# Patient Record
Sex: FEMALE | Race: ASIAN | NOT HISPANIC OR LATINO | Employment: UNEMPLOYED | URBAN - METROPOLITAN AREA
[De-identification: names, ages, dates, MRNs, and addresses within clinical notes are randomized per-mention and may not be internally consistent; named-entity substitution may affect disease eponyms.]

---

## 2019-10-15 ENCOUNTER — RESULTS ONLY (OUTPATIENT)
Dept: FAMILY MEDICINE | Facility: CLINIC | Age: 39
End: 2019-10-15

## 2019-10-15 ENCOUNTER — OFFICE VISIT (OUTPATIENT)
Dept: FAMILY MEDICINE | Facility: CLINIC | Age: 39
End: 2019-10-15
Payer: COMMERCIAL

## 2019-10-15 DIAGNOSIS — J18.9 PNEUMONIA OF RIGHT MIDDLE LOBE DUE TO INFECTIOUS ORGANISM: ICD-10-CM

## 2019-10-15 DIAGNOSIS — R05.9 COUGH: Primary | ICD-10-CM

## 2019-10-15 LAB
ALBUMIN SERPL BCP-MCNC: 3.8 G/DL (ref 3.5–5)
ALP SERPL-CCNC: 75 U/L (ref 45–120)
ALT SERPL W/O P-5'-P-CCNC: 42 U/L (ref 0–45)
ANION GAP SERPL CALCULATED.3IONS-SCNC: 10 MMOL/L (ref 5–18)
AST SERPL-CCNC: 24 U/L (ref 0–40)
BASOPHILS # BLD AUTO: 0.1 THOU/UL (ref 0–0.2)
BASOPHILS NFR BLD AUTO: 1 % (ref 0–2)
BILIRUB SERPL-MCNC: 0.3 MG/DL (ref 0–1)
BUN SERPL-MCNC: 11 MG/DL (ref 8–22)
CALCIUM SERPL-MCNC: 9.5 MG/DL (ref 8.5–10.5)
CHLORIDE SERPL-SCNC: 105 MMOL/L (ref 98–107)
CO2 SERPL-SCNC: 24 MMOL/L (ref 22–31)
CREAT SERPL-MCNC: 0.71 MG/DL (ref 0.6–1.1)
EOSINOPHIL # BLD AUTO: 0.1 THOU/UL (ref 0–0.4)
EOSINOPHIL NFR BLD AUTO: 1 % (ref 0–6)
ERYTHROCYTE [DISTWIDTH] IN BLOOD BY AUTOMATED COUNT: 12.6 % (ref 11–14.5)
GLUCOSE SERPL-MCNC: 90 MG/DL (ref 70–125)
HCT VFR BLD AUTO: 45.8 % (ref 35–47)
HGB BLD-MCNC: 14.7 G/DL (ref 12–16)
LYMPHOCYTES # BLD AUTO: 1.7 THOU/UL (ref 0.8–4.4)
LYMPHOCYTES NFR BLD AUTO: 19 % (ref 20–40)
MCH RBC QN AUTO: 28.8 PG (ref 27–34)
MCHC RBC AUTO-ENTMCNC: 32.1 G/DL (ref 32–36)
MCV RBC AUTO: 90 FL (ref 80–100)
MONOCYTES # BLD AUTO: 0.5 THOU/UL (ref 0–0.9)
MONOCYTES NFR BLD AUTO: 6 % (ref 2–10)
NEUTROPHILS # BLD AUTO: 6.5 THOU/UL (ref 2–7.7)
NEUTROPHILS NFR BLD AUTO: 74 % (ref 50–70)
PLATELET # BLD AUTO: 394 THOU/UL (ref 140–440)
PMV BLD AUTO: 10.7 FL (ref 8.5–12.5)
POTASSIUM SERPL-SCNC: 4.1 MMOL/L (ref 3.5–5)
PROT SERPL-MCNC: 7.8 G/DL (ref 6–8)
RBC # BLD AUTO: 5.1 MILL/UL (ref 3.8–5.4)
SODIUM SERPL-SCNC: 139 MMOL/L (ref 136–145)
WBC # BLD AUTO: 8.8 THOU/UL (ref 4–11)

## 2019-10-15 NOTE — LETTER
October 16, 2019      Adrianna Bro  [address] [facility] [address]        Dear Adrianna,    Thank you for getting your care at [facility]. Please see below for your test results.    Your xray showed a pneumonia on the right. You will need to get another chest xray in 2 months to make sure that the changes go away.      If you have any concerns about these results please call and leave a message for me or send a [facility] message to the clinic.    Sincerely,    Gabby Quiroga MD

## 2019-10-16 ENCOUNTER — TELEPHONE (OUTPATIENT)
Dept: FAMILY MEDICINE | Facility: CLINIC | Age: 39
End: 2019-10-16

## 2019-10-16 VITALS
SYSTOLIC BLOOD PRESSURE: 113 MMHG | HEART RATE: 71 BPM | OXYGEN SATURATION: 97 % | RESPIRATION RATE: 24 BRPM | TEMPERATURE: 98.5 F | DIASTOLIC BLOOD PRESSURE: 77 MMHG | WEIGHT: 165 LBS | BODY MASS INDEX: 33.33 KG/M2

## 2019-10-16 LAB — 25(OH)D3 SERPL-MCNC: 19.2 NG/ML (ref 30–80)

## 2019-10-16 RX ORDER — LEVOFLOXACIN 750 MG/1
750 TABLET, FILM COATED ORAL DAILY
Qty: 5 TABLET | Refills: 0 | COMMUNITY
Start: 2019-10-16 | End: 2021-07-13

## 2019-10-16 NOTE — TELEPHONE ENCOUNTER
[facility] phone call message- general phone call:    Reason for call: She came in yesterday and seen  the medication she was prescribed is cause a lot of side affects. She wants to know if she should continue to take it or not. She would also like a call back with her results.    Action desired: call back.    Return call needed: Yes    OK to leave a message on voice mail? Yes    Advised patient to response may take up to 2 business days: Yes    Primary language: English      needed? No    Call taken on October 16, 2019 at 9:15 AM by Stephon Charlton

## 2019-10-16 NOTE — PROGRESS NOTES
DOMINICK Rodas is a 39 year old  female  who presents for the new concern(s) of:    Chief Complaint   Patient presents with     Sick     been sick for 1 month, body aches, cough, green mucus, deep coughing, and fatigue        About a month ago, started with body ache and fever (did not check at home) a dn chills with a subsequent cough that was deep, and produced green sputum. The cough is actually getting better but continues very fatigue, with decreased appetite and decreased weight. At times she noted with a deep cough some shortness of breath but otherwise not. She is sleeping OK, and does not think she would be better if she were to sleep more. Did have some left upper back symptoms.     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    Also wanting to check her Vitamin D level.          Patient Active Problem List   Diagnosis     History of infertility, female     Oligomenorrhea     Hirsutism       No current outpatient medications on file.          Allergies   Allergen Reactions     Nkda [No Known Drug Allergies]         Results from the last 24 hoursNo results found for this or any previous visit (from the past 24 hour(s)).         Review of Systems:     CONSTITUTIONAL: as above  SKIN: no worrisome rashes, no worrisome moles, no worrisome lesions  NEURO: + frontal headaches, not facial pain  EYES: no acute vision problems or changes  ENT: no ear problems, no mouth problems, no throat problems  RESP: + cough, no shortness of breath  CV: no chest pain, no palpitations, no new or worsening peripheral edema  GI: no nausea, no vomiting, no constipation, no diarrhea, no belly pain  EXT: no edema            Physical Exam:     Vitals:    10/16/19 1025   BP: 113/77   Pulse: 71   Resp: 24   Temp: 98.5  F (36.9  C)   TempSrc: Oral   SpO2: 97%   Weight: 74.8 kg (165 lb)     Body mass index is 33.33 kg/m .  Vitals were reviewed and were normal  GENERAL: healthy, alert, well nourished, well hydrated, no distress  HENT:  ear canals- normal; TMs- normal; Nose- normal; Mouth- no ulcers, no lesions  NECK: no tenderness, no adenopathy, no asymmetry, no masses, no stiffness; thyroid- normal to palpation  RESP: lungs clear to auscultation - no rales, no rhonchi, no wheezes  CV: regular rates and rhythm, normal S1 S2, no S3 or S4 and no murmur, no click or rub -  ABDOMEN: soft, no tenderness, no  hepatosplenomegaly, no masses, normal bowel sounds  MS: extremities- no gross deformities noted, no edema    CXR: RML consolidation.        Results for orders placed or performed in visit on 03/25/15   HPV [place] PCR ([employer])   Result Value Ref Range    Interpretation No HPV Type(s) Detected No HPV Typ     Elliott Keyes MD, [employer]     Narrative    Test performed by:  [facility] [address] [place] [address]  No HPV Type(s) Detected  Interpretation: The sample is negative for the presence of DNA from one or   more of the following high risk HPV types (16, 18, 31, 33, 35, 39, 45, 51, 52,   56, 58, 66, and 68) and/or probable high or low risk HPV types (2a, 6, 11,   26, 30, 32, 34, 42, 43, 44, 53, 54, 55, 57, 61, 62, 67, 69, 70, 71, 72, 73,   74, 77, 81, 82, 83, 84, 85, 87, 89).  High risk types are classified by the   IARC as carcinogenic, probably, or possible carcinogenic to humans.  According   to the IARC, low risk types are not classifiable as to their carcingenicity   to humans. These results do not exclude the possibility of additional low or   high risk HPV types not detected due to adequacy and representativeness of   sampling or assay sensitivity.  Comments: The absence of low and or high risk HPV types reduces the collective   risk for the development of cervical dysplasia or neoplasia.  This result, in   association with the morphology assesssment of the Pap sample are srinivasan   information for the determination of follow-up testing and in the clinical   management of the  patient.  Methodology:  Genomic DNA was extracted from the submitted specimen and   amplified by the polymerase chain reaction (PCR) using consensus   oligonucleotide primers for the L1 region of the human papillomavirus (HPV)   genome.  Concurrently, the integrity of the extracted DNA was evaluated by the   amplification of beta-globin, a common housekeeping gene.  Result   interpretation is performed by analysis of peak height and size data generated   through fluorescence detection by automated electrophoresis.  HPV DNA   positive PCR products were subjected to digestion by the restriction   endonucleases Hae III, Pst 1, and Rsa 1.  Digested DNA fragments were    by automated electrophoresis.  Digital electropherograms and gel   images of data were generated and the specific HPV type was determined by   matching the restriction fragment patterns of the respective specimens to that   of known HPV restriction fragment patterns.  The analytical and performance   characteristics of this laboratory-developed test (LDT) were determined by   [employer] pursuant to Clinical Laboratory Improvement Amendments (CLIA   88) requirements.  It has not been cleared or approved by the U.S. Food and   Drug Administration (FDA).  The FDA has determined that such clearance or   approval is not a requirement prior to use for clinical purposes.       Results for orders placed or performed in visit on 03/25/15   HPV [place] PCR ([employer])   Result Value Ref Range    Interpretation No HPV Type(s) Detected No HPV Typ     Elliott Keyes MD, [employer]     Narrative    Test performed by:  [facility] [address] [place] [address]  No HPV Type(s) Detected  Interpretation: The sample is negative for the presence of DNA from one or   more of the following high risk HPV types (16, 18, 31, 33, 35, 39, 45, 51, 52,   56, 58, 66, and 68) and/or probable high or low risk HPV types (2a, 6, 11,    26, 30, 32, 34, 42, 43, 44, 53, 54, 55, 57, 61, 62, 67, 69, 70, 71, 72, 73,   74, 77, 81, 82, 83, 84, 85, 87, 89).  High risk types are classified by the   IARC as carcinogenic, probably, or possible carcinogenic to humans.  According   to the IARC, low risk types are not classifiable as to their carcingenicity   to humans. These results do not exclude the possibility of additional low or   high risk HPV types not detected due to adequacy and representativeness of   sampling or assay sensitivity.  Comments: The absence of low and or high risk HPV types reduces the collective   risk for the development of cervical dysplasia or neoplasia.  This result, in   association with the morphology assesssment of the Pap sample are srinivasan   information for the determination of follow-up testing and in the clinical   management of the patient.  Methodology:  Genomic DNA was extracted from the submitted specimen and   amplified by the polymerase chain reaction (PCR) using consensus   oligonucleotide primers for the L1 region of the human papillomavirus (HPV)   genome.  Concurrently, the integrity of the extracted DNA was evaluated by the   amplification of beta-globin, a common housekeeping gene.  Result   interpretation is performed by analysis of peak height and size data generated   through fluorescence detection by automated electrophoresis.  HPV DNA   positive PCR products were subjected to digestion by the restriction   endonucleases Hae III, Pst 1, and Rsa 1.  Digested DNA fragments were    by automated electrophoresis.  Digital electropherograms and gel   images of data were generated and the specific HPV type was determined by   matching the restriction fragment patterns of the respective specimens to that   of known HPV restriction fragment patterns.  The analytical and performance   characteristics of this laboratory-developed test (LDT) were determined by   [employer] pursuant to Clinical Laboratory  Improvement Amendments (CLIA   88) requirements.  It has not been cleared or approved by the U.S. Food and   Drug Administration (FDA).  The FDA has determined that such clearance or   approval is not a requirement prior to use for clinical purposes.          Assessment and Plan     Adrianna was seen today for sick.    Diagnoses and all orders for this visit:    Cough -     XR CHEST 2 VW    Pneumonia of right middle lobe due to infectious organism (H)- RML pneumonia with no risk factors (non smoker, no asthma) that will treat with Levaquin 750 every day x 5 days.  Epic down during the appointment so unable to see lab data.  Lab data remarkable for normal white count and CMP.  Urine negative but concentrated.  Recommended she not work for 2 days and then be seen again to assure she is improving. Will need repeat CVR in 1 - 2 months to assure resolution.           There are no discontinued medications.    Total time: 35 minutes with more than half spent counseling on her pneumonia and options moving forward.          Options for treatment and follow-up care were reviewed with the patient . Adrianna Bro  engaged in the decision making process and verbalized understanding of the options discussed and agreed with the final plan.    Gabby Quiroga MD

## 2019-10-16 NOTE — TELEPHONE ENCOUNTER
Patient reports N/V, weakness,drowsiness,and headache after starting antibiotic that was ordered yesterday. She has only taken one dose. Writer encouraged pt to hold medication until PCP gives recommendations.   Patient given preliminary results of labs.   Pt voices understanding to all information given.    Note routed to Dr.Padhye Janeth BUCIO

## 2019-10-17 DIAGNOSIS — J18.9 PNEUMONIA OF LEFT LOWER LOBE DUE TO INFECTIOUS ORGANISM: Primary | ICD-10-CM

## 2019-10-17 RX ORDER — AMOXICILLIN 500 MG/1
1000 CAPSULE ORAL 3 TIMES DAILY
Qty: 30 CAPSULE | Refills: 0 | Status: SHIPPED | OUTPATIENT
Start: 2019-10-17 | End: 2019-10-22

## 2019-10-17 RX ORDER — AZITHROMYCIN 250 MG/1
TABLET, FILM COATED ORAL
Qty: 6 TABLET | Refills: 0 | Status: SHIPPED | OUTPATIENT
Start: 2019-10-17 | End: 2019-10-22

## 2020-06-24 ENCOUNTER — VIRTUAL VISIT (OUTPATIENT)
Dept: FAMILY MEDICINE | Facility: CLINIC | Age: 40
End: 2020-06-24
Payer: COMMERCIAL

## 2020-06-24 DIAGNOSIS — Z32.01 PREGNANCY TEST POSITIVE: Primary | ICD-10-CM

## 2020-06-24 DIAGNOSIS — O26.851 SPOTTING AFFECTING PREGNANCY IN FIRST TRIMESTER: ICD-10-CM

## 2020-06-24 RX ORDER — PRENATAL VIT/IRON FUM/FOLIC AC 27MG-0.8MG
1 TABLET ORAL DAILY
Qty: 90 TABLET | Refills: 3 | Status: SHIPPED | OUTPATIENT
Start: 2020-06-24 | End: 2021-07-13

## 2020-06-24 NOTE — PROGRESS NOTES
Preceptor Attestation:   I talked to the patient on the phone. I discussed the patient with the resident. I have verified the content of the note, which accurately reflects my assessment of the patient and the plan of care.   Supervising Physician:  Todd Barajas MD.

## 2020-06-24 NOTE — PROGRESS NOTES
"Family Medicine Telephone Visit Note         Telephone Visit Consent   Patient was verbally read the following and verbal consent was obtained.    \"Telephone visits are billed at different rates depending on your insurance coverage. During this emergency period, for some insurers they may be billed the same as an in-person visit.  Please reach out to your insurance provider with any questions.  If during the course of the call the physician/provider feels a telephone visit is not appropriate, you will not be charged for this service.\"    Name person giving consent:  Patient   Date verbal consent given:  6/24/2020  Time verbal consent given:  10:26 AM    Chief Complaint   Patient presents with     Vaginal Bleeding     Positive UPT, spotting this morning          HPI   Patients name: Adrianna  Appointment start time: 10:32 AM    Adrianna Bro is a 40 year old female who presents to clinic via telephone visit today for evaluation of vaginal bleeding in the setting of a positive pregnancy test.    Adrianna took a home pregnancy test a week and a half ago x2 that resulted positive.  She is prompted to take a home pregnancy test as her period was late.  Her last menstrual period started on May 1, 2020.  She is very regular so she was surprised when she did not have her June cycle.  She has 4 children ages 24, 22, 12 and 9.  This was a surprise pregnancy for both her and her  but they are very excited.  They have all boys and are hoping for a girl.  She does have a history of a miscarriage in the past before the 12-year-old was born.    This morning when going to the bathroom Adrianna noted some blood on the toilet paper.  She describes this blood is a darker color.  She noticed this again when going to the bathroom a second time.  She went out about home pregnancy test and retook it which still resulted positive.  She noted that the cup the urine and was also slightly blood-tinged.  She thinks that the spotting has lessened " "since earlier this morning.  She has not had any abdominal cramping.  She has had no dysuria or pain with going to the bathroom.  She has not had any abdominal pain fevers or chills.  She has not had any nausea or vomiting.  She has not had any abnormal vaginal discharge.  She did have intercourse around midnight this morning.    Would like prenatal vitamins prescribed as she endorsed that she does not eat as healthy as she used to.  She is planning on receiving prenatal care at [facility].    Current Outpatient Medications   Medication Sig Dispense Refill     levofloxacin (LEVAQUIN) 750 MG tablet Take 1 tablet (750 mg) by mouth daily 5 tablet 0     Allergies   Allergen Reactions     Nkda [No Known Drug Allergies]             Review of Systems:     Constitutional, HEENT, cardiovascular, pulmonary, gi and gu systems are negative, except as otherwise noted.         Physical Exam:     There were no vitals taken for this visit.  Estimated body mass index is 33.33 kg/m  as calculated from the following:    Height as of 3/25/15: 1.499 m (4' 11\").    Weight as of 10/16/19: 74.8 kg (165 lb).    Exam:  Constitutional: healthy, alert and no distress  Psychiatric: mentation appears normal and affect normal/bright          Assessment and Plan     Pregnancy test positive  Spotting affecting pregnancy in first trimester    At this time etiology of spotting in first trimester is somewhat unclear.  I did provide assurance that some breakthrough bleeding is normal in the first trimester especially in the setting of the first missed period.  I would suspect this would be the etiology as the bleeding was somewhat dark.  I am assured that it has lessened throughout the day.  She did have intercourse last night so there could be some cervical/tissue trauma contributing as well.  I discussed that I was not sure as to whether she was having a miscarriage or not, and that we would need to carefully watch for now.  Discussed that " bleeding would likely become more heavy and she would experience some cramping if this were the case.  I am in clinic in 2 days and discussed a phone follow-up to check in and see how she is doing.     Patient requested prenatal vitamins which we will send to pharmacy today.  They are surprised by this pregnancy but quite excited and hoping for the best. I will also forward chart to OB coordinator [facility] as patient is planning to receive prenatal care at [facility] as she is done with all her pregnancies.  Discussed following up with me for first prenatal visit as appropriate.    - Prenatal Vit-Fe Fumarate-FA (PRENATAL MULTIVITAMIN W/IRON) 27-0.8 MG tablet; Take 1 tablet by mouth daily  Dispense: 90 tablet; Refill: 3    Refilled medications that would be required in the next 3 months.     After Visit Information:  Patient declined AVS     Appointment end time: 10:53 AM  This is a telephone visit that took 21 minutes.    Clinician location: [facility]    Osiel Ash MD    I precepted today with Dr Barajas

## 2020-06-26 ENCOUNTER — TELEPHONE (OUTPATIENT)
Dept: FAMILY MEDICINE | Facility: CLINIC | Age: 40
End: 2020-06-26

## 2020-06-26 DIAGNOSIS — O26.851 SPOTTING AFFECTING PREGNANCY IN FIRST TRIMESTER: Primary | ICD-10-CM

## 2020-06-26 NOTE — TELEPHONE ENCOUNTER
Clinic Triage Call    Ms. Bro is a 40-year-old  at an estimated 8w0d (based on LMP 20) who calls with increased vaginal bleeding.  She had a virtual visit with Dr. Ash two days ago (20) for first-trimester spotting, but following that visit, she had 8 hours of heavier bleeding.  No pain or passage of tissue.  She is sad because she is worried about the pregnancy.    Plan:  -- Today is Friday.  Scheduled for US Monday 8:00 AM.  -- Beta-hCG when come into office Monday.  -- Discussed if symptoms suddenly worsen, to call answering service over the weekend.    Signed out to PCP Dr. Ash.  I'll be available Monday if needed, and Dr. Ash will resume care from there, including any need for serial labs.    Khushi Rivera MD  Precepting provider

## 2020-06-26 NOTE — TELEPHONE ENCOUNTER
[facility] phone call message-patient reporting a symptom:     Symptom: bleeding     Same Day Visit Offered: needs to talk to the nurse     Additional comments:     OK to leave message on voice mail? Yes      Primary language: English      needed? No    Call taken on June 26, 2020 at 8:19 AM by Alex Osorio

## 2020-06-29 DIAGNOSIS — O26.851 SPOTTING AFFECTING PREGNANCY IN FIRST TRIMESTER: ICD-10-CM

## 2020-06-29 DIAGNOSIS — O26.851 SPOTTING AFFECTING PREGNANCY IN FIRST TRIMESTER: Primary | ICD-10-CM

## 2020-06-29 LAB — B-HCG SERPL-ACNC: 53 MLU/ML (ref 0–4)

## 2020-06-30 ENCOUNTER — TELEPHONE (OUTPATIENT)
Dept: FAMILY MEDICINE | Facility: CLINIC | Age: 40
End: 2020-06-30

## 2020-06-30 DIAGNOSIS — O26.851 SPOTTING AFFECTING PREGNANCY IN FIRST TRIMESTER: Primary | ICD-10-CM

## 2020-06-30 NOTE — TELEPHONE ENCOUNTER
Called to follow-up on spotting in pregnancy.  We discussed results of early OB US and quantitative beta-hCG.  At this time her OB ultrasound does not show any evidence of intra-or extrauterine pregnancy, quantitative beta-hCG is 53 which would be less than what her expected would be at this time gestation.  We discussed that presentation is consistent with miscarriage at this time.  She is still spotting currently, but it has lightened up since her heavy bleeding on Friday.  Discussed that we should trend her beta hCG and recheck it in about 1 week. She is sad about the news of miscarriage but understanding and appreciative of cares received by [facility] team. I will touch base with her later in the week regarding symptoms and whether we should do just lab only draw versus repeat phone visit.      Osiel Ash MD

## 2020-07-08 DIAGNOSIS — O26.851 SPOTTING AFFECTING PREGNANCY IN FIRST TRIMESTER: ICD-10-CM

## 2020-07-08 LAB — B-HCG SERPL-ACNC: 3 MLU/ML (ref 0–4)

## 2021-05-29 ENCOUNTER — RECORDS - HEALTHEAST (OUTPATIENT)
Dept: ADMINISTRATIVE | Facility: CLINIC | Age: 41
End: 2021-05-29

## 2021-05-30 ENCOUNTER — RECORDS - HEALTHEAST (OUTPATIENT)
Dept: ADMINISTRATIVE | Facility: CLINIC | Age: 41
End: 2021-05-30

## 2021-06-02 ENCOUNTER — RECORDS - HEALTHEAST (OUTPATIENT)
Dept: ADMINISTRATIVE | Facility: CLINIC | Age: 41
End: 2021-06-02

## 2021-07-13 ENCOUNTER — OFFICE VISIT (OUTPATIENT)
Dept: FAMILY MEDICINE | Facility: CLINIC | Age: 41
End: 2021-07-13
Payer: COMMERCIAL

## 2021-07-13 VITALS
DIASTOLIC BLOOD PRESSURE: 81 MMHG | SYSTOLIC BLOOD PRESSURE: 118 MMHG | HEIGHT: 60 IN | OXYGEN SATURATION: 100 % | BODY MASS INDEX: 32.39 KG/M2 | RESPIRATION RATE: 20 BRPM | TEMPERATURE: 98.1 F | WEIGHT: 165 LBS | HEART RATE: 56 BPM

## 2021-07-13 DIAGNOSIS — H91.93 BILATERAL HEARING LOSS, UNSPECIFIED HEARING LOSS TYPE: ICD-10-CM

## 2021-07-13 DIAGNOSIS — L29.9 ITCHY SCALP: ICD-10-CM

## 2021-07-13 DIAGNOSIS — Z13.1 SCREENING FOR DIABETES MELLITUS: ICD-10-CM

## 2021-07-13 DIAGNOSIS — R53.83 FATIGUE, UNSPECIFIED TYPE: ICD-10-CM

## 2021-07-13 DIAGNOSIS — Z13.220 SCREENING FOR LIPID DISORDERS: ICD-10-CM

## 2021-07-13 DIAGNOSIS — Z00.00 ROUTINE GENERAL MEDICAL EXAMINATION AT A HEALTH CARE FACILITY: Primary | ICD-10-CM

## 2021-07-13 DIAGNOSIS — Z23 NEED FOR VACCINATION: ICD-10-CM

## 2021-07-13 DIAGNOSIS — H91.93 HEARING DIFFICULTY OF BOTH EARS: ICD-10-CM

## 2021-07-13 PROBLEM — K21.9 GASTRIC REFLUX: Status: ACTIVE | Noted: 2021-07-13

## 2021-07-13 LAB
ERYTHROCYTE [DISTWIDTH] IN BLOOD BY AUTOMATED COUNT: 12.7 % (ref 10–15)
HBA1C MFR BLD: 5.4 % (ref 0–5.6)
HCT VFR BLD AUTO: 46.2 % (ref 35–47)
HGB BLD-MCNC: 15.2 G/DL (ref 11.7–15.7)
MCH RBC QN AUTO: 28.8 PG (ref 26.5–33)
MCHC RBC AUTO-ENTMCNC: 32.9 G/DL (ref 31.5–36.5)
MCV RBC AUTO: 88 FL (ref 78–100)
PLATELET # BLD AUTO: 229 10E3/UL (ref 150–450)
RBC # BLD AUTO: 5.27 10E6/UL (ref 3.8–5.2)
WBC # BLD AUTO: 6.7 10E3/UL (ref 4–11)

## 2021-07-13 PROCEDURE — 90471 IMMUNIZATION ADMIN: CPT | Performed by: STUDENT IN AN ORGANIZED HEALTH CARE EDUCATION/TRAINING PROGRAM

## 2021-07-13 PROCEDURE — 99396 PREV VISIT EST AGE 40-64: CPT | Mod: 25 | Performed by: STUDENT IN AN ORGANIZED HEALTH CARE EDUCATION/TRAINING PROGRAM

## 2021-07-13 PROCEDURE — 80061 LIPID PANEL: CPT | Performed by: STUDENT IN AN ORGANIZED HEALTH CARE EDUCATION/TRAINING PROGRAM

## 2021-07-13 PROCEDURE — 84443 ASSAY THYROID STIM HORMONE: CPT | Performed by: STUDENT IN AN ORGANIZED HEALTH CARE EDUCATION/TRAINING PROGRAM

## 2021-07-13 PROCEDURE — 36415 COLL VENOUS BLD VENIPUNCTURE: CPT | Performed by: STUDENT IN AN ORGANIZED HEALTH CARE EDUCATION/TRAINING PROGRAM

## 2021-07-13 PROCEDURE — 90715 TDAP VACCINE 7 YRS/> IM: CPT | Performed by: STUDENT IN AN ORGANIZED HEALTH CARE EDUCATION/TRAINING PROGRAM

## 2021-07-13 PROCEDURE — 85027 COMPLETE CBC AUTOMATED: CPT | Performed by: STUDENT IN AN ORGANIZED HEALTH CARE EDUCATION/TRAINING PROGRAM

## 2021-07-13 PROCEDURE — 80048 BASIC METABOLIC PNL TOTAL CA: CPT | Performed by: STUDENT IN AN ORGANIZED HEALTH CARE EDUCATION/TRAINING PROGRAM

## 2021-07-13 PROCEDURE — 82306 VITAMIN D 25 HYDROXY: CPT | Performed by: STUDENT IN AN ORGANIZED HEALTH CARE EDUCATION/TRAINING PROGRAM

## 2021-07-13 PROCEDURE — 87389 HIV-1 AG W/HIV-1&-2 AB AG IA: CPT | Performed by: STUDENT IN AN ORGANIZED HEALTH CARE EDUCATION/TRAINING PROGRAM

## 2021-07-13 PROCEDURE — 83036 HEMOGLOBIN GLYCOSYLATED A1C: CPT | Performed by: STUDENT IN AN ORGANIZED HEALTH CARE EDUCATION/TRAINING PROGRAM

## 2021-07-13 RX ORDER — KETOCONAZOLE 20 MG/ML
SHAMPOO TOPICAL DAILY PRN
Qty: 120 ML | Refills: 1 | Status: SHIPPED | OUTPATIENT
Start: 2021-07-13

## 2021-07-13 ASSESSMENT — MIFFLIN-ST. JEOR: SCORE: 1317

## 2021-07-13 NOTE — PROGRESS NOTES
Preceptor Attestation:    I discussed the patient with the resident and student and evaluated the patient in person. I have verified the content of the note, which accurately reflects my assessment of the patient and the plan of care.   Supervising Physician:  Ashanti Vásquez MD.

## 2021-07-13 NOTE — PATIENT INSTRUCTIONS
Preventive Health Recommendations  Female Ages 40 to 49    Yearly exam:     See your health care provider every year in order to  1. Review health changes.   2. Discuss preventive care.    3. Review your medicines if your doctor prescribed any.      Get a Pap test every three years (unless you have an abnormal result and your provider advises testing more often).      If you get Pap tests with HPV test, you only need to test every 5 years, unless you have an abnormal result. You do not need a Pap test if your uterus was removed (hysterectomy) and you have not had cancer.      You should be tested each year for STDs (sexually transmitted diseases), if you're at risk.     Ask your doctor if you should have a mammogram.      Have a colonoscopy (test for colon cancer) if someone in your family has had colon cancer or polyps before age 50.       Have a cholesterol test every 5 years.       Have a diabetes test (fasting glucose) after age 45. If you are at risk for diabetes, you should have this test every 3 years.    Shots: Get a flu shot each year. Get a tetanus shot every 10 years.     Nutrition:     Eat at least 5 servings of fruits and vegetables each day.    Eat whole-grain bread, whole-wheat pasta and brown rice instead of white grains and rice.    Get adequate Calcium and Vitamin D.      Lifestyle    Exercise at least 150 minutes a week (an average of 30 minutes a day, 5 days a week). This will help you control your weight and prevent disease.    Limit alcohol to one drink per day.    No smoking.     Wear sunscreen to prevent skin cancer.    See your dentist every six months for an exam and cleaning.  Preventive Health Recommendations  Female Ages 40 to 49    Yearly exam:     See your health care provider every year in order to  4. Review health changes.   5. Discuss preventive care.    6. Review your medicines if your doctor prescribed any.      Get a Pap test every three years (unless you have an abnormal  result and your provider advises testing more often).      If you get Pap tests with HPV test, you only need to test every 5 years, unless you have an abnormal result. You do not need a Pap test if your uterus was removed (hysterectomy) and you have not had cancer.      You should be tested each year for STDs (sexually transmitted diseases), if you're at risk.     Ask your doctor if you should have a mammogram.      Have a colonoscopy (test for colon cancer) if someone in your family has had colon cancer or polyps before age 50.       Have a cholesterol test every 5 years.       Have a diabetes test (fasting glucose) after age 45. If you are at risk for diabetes, you should have this test every 3 years.    Shots: Get a flu shot each year. Get a tetanus shot every 10 years.     Nutrition:     Eat at least 5 servings of fruits and vegetables each day.    Eat whole-grain bread, whole-wheat pasta and brown rice instead of white grains and rice.    Get adequate Calcium and Vitamin D.      Lifestyle    Exercise at least 150 minutes a week (an average of 30 minutes a day, 5 days a week). This will help you control your weight and prevent disease.    Limit alcohol to one drink per day.    No smoking.     Wear sunscreen to prevent skin cancer.    See your dentist every six months for an exam and cleaning.    07/15/21   [facility]  Phone: [phone]  Fax: [phone]    Fax demographics, referral and office notes to [phone]. They will contact patient to schedule.     Carrie Figueredo

## 2021-07-13 NOTE — PROGRESS NOTES
Female Physical Note    Concerns today: - Requests hearing check - feels like hearing and memory have been affected due to marriage conflict and psychological trauma a few years ago.  - Fatigue - requests lab checks, specifically vitamin D  - Refill of ketoconazole shampoo for itchy scalp    ROS:  CONSTITUTIONAL: + fatigue, no unexpected change in weight  SKIN: no worrisome rashes, no worrisome moles, no worrisome lesions  EYES: no acute vision problems or changes  ENT: + hearing change, no mouth problems, no throat problems  RESP: no significant cough, no shortness of breath  CV: no chest pain, no palpitations, no new or worsening peripheral edema  GI: no nausea, no vomiting, no constipation, no diarrhea  MUSCULOSKELETAL: + muscle pain and pain with walking  : no urinary problems, sexual problems    Sexually Active: Yes  Sexual concerns: No   Contraception:Details: no, declined   [id number]  Menarche: regular Patient's last menstrual period was 2021 (approximate).  STD History: Neg  Last Pap Smear Date: 3/2015 normal  Abnormal Pap History: None    Patient Active Problem List   Diagnosis     History of infertility, female     Oligomenorrhea     Hirsutism     Gastric reflux       Current Outpatient Medications   Medication Sig Dispense Refill     ketoconazole (NIZORAL) 2 % external shampoo Apply topically daily as needed for itching or irritation 120 mL 1       History reviewed. No pertinent past medical history.     Family History     Problem (# of Occurrences) Relation (Name,Age of Onset)    Asthma (1) Mother    Cerebrovascular Disease (1) Mother    Depression (1) Mother    Diabetes (1) Mother    Hearing Loss (1) Mother    Heart Disease (1) Mother    Hepatitis (1) Father       Negative family history of: Hypertension, Other Cancer        Problem List Medication List and Allergy List were reviewed.    Patient is an established patient of this clinic..    Social History     Tobacco Use     Smoking status:  Never Smoker     Smokeless tobacco: Never Used   Substance Use Topics     Alcohol use: No       Children ? yes 4    Has anyone hurt you physically, for example by pushing, hitting, slapping or kicking you or forcing you to have sex? Denies  Do you feel threatened or controlled by a partner, ex-partner or anyone in your life? Denies    RISK BEHAVIORS AND HEALTHY HABITS:  Tobacco Use/Smoking: None  Illicit Drug Use: None  Do you use alcohol? No  Diet (5-7 servings of fruits/veg daily): eats more rice/meat, not as much produce but trying to increase this summer  Exercise (30 min accumulated most days):Yes  Dental Care: No   Calcium 1500 mg/d:  No  Seat Belt Use: Yes     Cholesterol Level (>44 yo or at risk):  Recommended and patient accepted testing., Pap every 3 years for women 21-29. Recommended and patient declined testing., Pap/HPV cotest every 5 years for women 30-65   Recommended and patient declined testing. and HIV screening:  Recommended and patient accepted testing.  Breast CA Screening (>39 yo or 10 y before 1st degree relative diagnosis): Recommended and patient declined testing.  CV Risk based on Pooled Cohort Risk: updating lipid panel today  Pooled Cohort Risk Calculator    Immunization History   Administered Date(s) Administered     COVID-19,PF,Moderna 01/29/2021, 02/25/2021     Flu, Unspecified 10/30/2012     HepA-Adult 01/11/2006     HepA-Peds, Unspecified 05/05/2005     HepB, Unspecified 05/05/2005     HepB-Adult 01/11/2006, 01/26/2007     Historical DTP/aP 08/14/1984     Influenza (IIV3) PF 11/09/2007, 09/25/2011     Influenza,INJ,MDCK,PF,Quad >4yrs 12/09/2019     MMR 08/14/1984, 05/14/1990, 01/11/2006     OPV, trivalent, live 08/14/1984, 05/13/1985, 05/05/2005     Td (Adult), Adsorbed 05/13/1985, 05/05/2005     Typhoid IM 05/12/2005    Reviewed Immunization Record Today - due for Tdap today    EXAMINATION:   /81   Pulse 56   Temp 98.1  F (36.7  C) (Oral)   Resp 20   Ht 1.511 m (4'  "11.5\")   Wt 74.8 kg (165 lb)   LMP 06/02/2021 (Approximate)   SpO2 100%   BMI 32.77 kg/m    GENERAL: healthy, alert and no distress  EYES: Eyes grossly normal to inspection, extraocular movements - intact, and PERRL  HENT: ear canals- normal; TMs- normal; Nose- normal; Mouth- no ulcers, no lesions  NECK: no tenderness, no adenopathy, no asymmetry, no masses, no stiffness; thyroid- normal to palpation  RESP: lungs clear to auscultation - no rales, no rhonchi, no wheezes  CV: regular rates and rhythm, normal S1 S2, no S3 or S4 and no murmur, no click or rub -  ABDOMEN: soft, obese, no tenderness, no hepatosplenomegaly, no masses, normal bowel sounds  MS: extremities- no gross deformities noted, no edema  SKIN: no suspicious lesions, no rashes  NEURO: strength and tone- normal, sensory exam- grossly normal, mentation- intact, speech- normal, reflexes- symmetric  BACK: no midline tenderness, no paralumbar tenderness  PSYCH: Alert and oriented times 3; speech- coherent , normal rate and volume; able to articulate logical thoughts, able to abstract reason, no tangential thoughts, no hallucinations or delusions, affect- normal  LYMPHATICS: ant. cervical- normal, post. cervical- normal, supraclavicular- normal    ASSESSMENT/PLAN:  1. Routine general medical examination at a health care facility  Received Tdap and updating labs as below. Patient declined pap smear and mammogram; will consider for next year - discussed importance of these preventative screenings. Declines contraception and desires to have more children.  - TDAP VACCINE (Adacel, Boostrix)  [[id number]]  - Lipid Panel; Future  - CBC with platelets; Future  - Basic metabolic panel; Future  - HIV Antigen Antibody Combo; Future  - TSH with free T4 reflex; Future    2. Need for vaccination  As above.  - TDAP VACCINE (Adacel, Boostrix)  [[id number]]    3. Screening for lipid disorders  As above. Last lipid panel in 2012.    4. Screening for diabetes mellitus  BMI " 32.77 and family hx of diabetes.  - Hemoglobin A1c; Future    5. Itchy scalp  Refilled per patient request. OTC medications also recommended and information provided to patient.  - ketoconazole (NIZORAL) 2 % external shampoo; Apply topically daily as needed for itching or irritation  Dispense: 120 mL; Refill: 1    6. Fatigue, unspecified type  Patient reports increased fatigue recently. Reports having low vitamin D levels in the past. Checking hemoglobin, TSH, and vitamin D today.  - CBC with platelets; Future  - TSH with free T4 reflex; Future  - Vitamin D Deficiency; Future    Routine Follow up in 1 year, sooner if needed.      Melina Reyes, Medical Student Year 4    I have seen and examined the patient.  I have discussed the case with Student Doctor Melina Reyes.  I agree with the findings, assessment and plan.     Benita Behm, MD PGY2  [facility]  July 13, 2021    I precepted today with Dr. Vásquez.

## 2021-07-14 DIAGNOSIS — E55.9 VITAMIN D DEFICIENCY: Primary | ICD-10-CM

## 2021-07-14 LAB
ANION GAP SERPL CALCULATED.3IONS-SCNC: 11 MMOL/L (ref 5–18)
BUN SERPL-MCNC: 12 MG/DL (ref 8–22)
CALCIUM SERPL-MCNC: 9.4 MG/DL (ref 8.5–10.5)
CHLORIDE BLD-SCNC: 105 MMOL/L (ref 98–107)
CHOLEST SERPL-MCNC: 193 MG/DL
CO2 SERPL-SCNC: 24 MMOL/L (ref 22–31)
CREAT SERPL-MCNC: 0.71 MG/DL (ref 0.6–1.1)
DEPRECATED CALCIDIOL+CALCIFEROL SERPL-MC: 26 UG/L (ref 30–80)
FASTING STATUS PATIENT QL REPORTED: ABNORMAL
GFR SERPL CREATININE-BSD FRML MDRD: >90 ML/MIN/1.73M2
GLUCOSE BLD-MCNC: 85 MG/DL (ref 70–125)
HDLC SERPL-MCNC: 45 MG/DL
HIV 1+2 AB+HIV1 P24 AG SERPL QL IA: NEGATIVE
LDLC SERPL CALC-MCNC: 129 MG/DL
POTASSIUM BLD-SCNC: 4.4 MMOL/L (ref 3.5–5)
SODIUM SERPL-SCNC: 140 MMOL/L (ref 136–145)
TRIGL SERPL-MCNC: 96 MG/DL
TSH SERPL DL<=0.005 MIU/L-ACNC: 1.66 UIU/ML (ref 0.3–5)

## 2021-07-14 NOTE — RESULT ENCOUNTER NOTE
Called patient with lab results and informed her of vitamin D deficiency. Recommended she start taking 800 international unit(s) Vitamin D3 daily. She was understanding of the plan.     Benita Behm, MD PGY2  [facility]  07/14/21

## 2021-07-14 NOTE — PROGRESS NOTES
Patient found to have vitamin D deficiency, called and recommended treating with 800 international unit(s) vitamin D3 daily. No need for follow up vitamin D level. Patient was understanding.     Benita Behm, MD PGY2  [facility]  07/14/21

## 2021-07-29 ENCOUNTER — TRANSCRIBE ORDERS (OUTPATIENT)
Dept: OTHER | Age: 41
End: 2021-07-29

## 2021-07-29 DIAGNOSIS — H91.93 HEARING DIFFICULTY OF BOTH EARS: Primary | ICD-10-CM

## 2024-08-05 ENCOUNTER — OFFICE VISIT (OUTPATIENT)
Dept: FAMILY MEDICINE | Facility: CLINIC | Age: 44
End: 2024-08-05
Payer: COMMERCIAL

## 2024-08-05 VITALS
SYSTOLIC BLOOD PRESSURE: 126 MMHG | OXYGEN SATURATION: 100 % | TEMPERATURE: 98 F | DIASTOLIC BLOOD PRESSURE: 78 MMHG | BODY MASS INDEX: 33.65 KG/M2 | WEIGHT: 171.4 LBS | HEIGHT: 60 IN | RESPIRATION RATE: 20 BRPM | HEART RATE: 70 BPM

## 2024-08-05 DIAGNOSIS — I83.91 SPIDER VEIN OF RIGHT LOWER EXTREMITY: ICD-10-CM

## 2024-08-05 DIAGNOSIS — R60.0 BILATERAL LEG EDEMA: Primary | ICD-10-CM

## 2024-08-05 DIAGNOSIS — M54.50 ACUTE BILATERAL LOW BACK PAIN WITHOUT SCIATICA: ICD-10-CM

## 2024-08-05 LAB
ANION GAP SERPL CALCULATED.3IONS-SCNC: 9 MMOL/L (ref 7–15)
BUN SERPL-MCNC: 16.7 MG/DL (ref 6–20)
CALCIUM SERPL-MCNC: 8.6 MG/DL (ref 8.8–10.4)
CHLORIDE SERPL-SCNC: 106 MMOL/L (ref 98–107)
CREAT SERPL-MCNC: 0.68 MG/DL (ref 0.51–0.95)
CREAT UR-MCNC: 83.2 MG/DL
EGFRCR SERPLBLD CKD-EPI 2021: >90 ML/MIN/1.73M2
GLUCOSE SERPL-MCNC: 79 MG/DL (ref 70–99)
HCO3 SERPL-SCNC: 24 MMOL/L (ref 22–29)
MICROALBUMIN UR-MCNC: <12 MG/L
MICROALBUMIN/CREAT UR: NORMAL MG/G{CREAT}
POTASSIUM SERPL-SCNC: 3.8 MMOL/L (ref 3.4–5.3)
SODIUM SERPL-SCNC: 139 MMOL/L (ref 135–145)

## 2024-08-05 PROCEDURE — 82043 UR ALBUMIN QUANTITATIVE: CPT

## 2024-08-05 PROCEDURE — 80048 BASIC METABOLIC PNL TOTAL CA: CPT

## 2024-08-05 PROCEDURE — 99203 OFFICE O/P NEW LOW 30 MIN: CPT | Mod: GC

## 2024-08-05 PROCEDURE — 82570 ASSAY OF URINE CREATININE: CPT

## 2024-08-05 PROCEDURE — 36415 COLL VENOUS BLD VENIPUNCTURE: CPT

## 2024-08-05 NOTE — PATIENT INSTRUCTIONS
Nice to see you today!    Here's what we talked about:  - I will send you a letter with your results if they are normal, and will call you if they are not normal.  - Keep doing a low sodium diet - 1500 to 2300 mg daily. Get compression stockings from online and keep your feet elevated to help your veins move blood back to your heart.

## 2024-08-05 NOTE — PROGRESS NOTES
Preceptor Attestation:    I discussed the patient with the resident and evaluated the patient in person. I have verified the content of the note, which accurately reflects my assessment of the patient and the plan of care.   Supervising Physician:  Young Uriostegui MD.

## 2024-08-05 NOTE — LETTER
August 6, 2024      Adrianna Bro  [address] [place] [address]        Dear ,    We are writing to inform you of your test results.    Your lab results show that your kidneys are working well. Your calcium is slightly low but I am not worried about this. Please call our clinic if you have any questions.     Resulted Orders   Albumin Random Urine Quantitative with Creat Ratio   Result Value Ref Range    Creatinine Urine mg/dL 83.2 mg/dL      Comment:      The reference ranges have not been established in urine creatinine. The results should be integrated into the clinical context for interpretation.    Albumin Urine mg/L <12.0 mg/L      Comment:      The reference ranges have not been established in urine albumin. The results should be integrated into the clinical context for interpretation.    Albumin Urine mg/g Cr        Comment:      Unable to calculate, urine albumin and/or urine creatinine is outside detectable limits.  Microalbuminuria is defined as an albumin:creatinine ratio of 17 to 299 for males and 25 to 299 for females. A ratio of albumin:creatinine of 300 or higher is indicative of overt proteinuria.  Due to biologic variability, positive results should be confirmed by a second, first-morning random or 24-hour timed urine specimen. If there is discrepancy, a third specimen is recommended. When 2 out of 3 results are in the microalbuminuria range, this is evidence for incipient nephropathy and warrants increased efforts at glucose control, blood pressure control, and institution of therapy with an angiotensin-converting-enzyme (ACE) inhibitor (if the patient can tolerate it).     Basic metabolic panel   Result Value Ref Range    Sodium 139 135 - 145 mmol/L    Potassium 3.8 3.4 - 5.3 mmol/L    Chloride 106 98 - 107 mmol/L    Carbon Dioxide (CO2) 24 22 - 29 mmol/L    Anion Gap 9 7 - 15 mmol/L    Urea Nitrogen 16.7 6.0 - 20.0 mg/dL    Creatinine 0.68 0.51 - 0.95 mg/dL    GFR Estimate >90 >60  mL/min/1.73m2      Comment:      eGFR calculated using 2021 CKD-EPI equation.    Calcium 8.6 (L) 8.8 - 10.4 mg/dL      Comment:      Reference intervals for this test were updated on 7/16/2024 to reflect our healthy population more accurately. There may be differences in the flagging of prior results with similar values performed with this method. Those prior results can be interpreted in the context of the updated reference intervals.    Glucose 79 70 - 99 mg/dL       If you have any questions or concerns, please call the clinic at the number listed above.       Sincerely,      Melina Reyes MD

## 2024-08-05 NOTE — PROGRESS NOTES
Assessment & Plan     Bilateral leg edema  Spider vein of right lower extremity  Appears most consistent with chronic venous insufficiency with patient reporting skin changes that are likely related to venous stasis.  Patient is on her feet a lot and also notices that diet affects swelling, such as reducing the amount of sauces that she uses reduces the swelling.  CMP last year was normal.  Will recheck BMP and microalbumin today due to patient's concern about her kidneys.  - Basic metabolic panel  - Albumin Random Urine Quantitative with Creat Ratio  - Encourage low sodium diet, OTC compression stockings, elevate legs    Acute bilateral low back pain without sciatica  Appears most likely muscle strain based on exam and history.  Discussed conservative management including OTC Tylenol and ibuprofen as needed.      Return in about 4 days (around 8/9/2024) for Routine preventive.    Patient precepted with Dr. Uriostegui.    Melina Reyes MD  [facility]   Adrianna Bro is a 44 year old female presenting for the following health issues:    HPI  Patient reports bilateral leg swelling for the past 2 years and also noticed that she has overlying skin changes - skin looks rougher and darker especially on the toes. Started on L leg and now R leg swelling too; started more in feet and now moved up to knees.    Saw an outside provider in 8/2023 who ordered L DVT US that was negative.    Has been treating with Shaheen Spivey and Asian medicines. No chest pain or dyspnea with exertion. No swelling in other areas of body. Does not wear compression stockings. Swelling gets better with elevating legs. On feet a lot for work - works as a .    Patient reports intermittent low back pain for the past few days. No history of recent injury/trauma. Pain does not radiate. Has not taken any medications for this.    Review of Systems   Pertinent positives and negatives as noted in HPI.        Objective   "  /78 (BP Location: Right arm, Patient Position: Sitting, Cuff Size: Adult Regular)   Pulse 70   Temp 98  F (36.7  C) (Tympanic)   Resp 20   Ht 1.511 m (4' 11.5\")   Wt 77.7 kg (171 lb 6.4 oz)   LMP 06/24/2024   SpO2 100%   BMI 34.04 kg/m    Body mass index is 34.04 kg/m .  Physical Exam   GENERAL: alert and no distress  HEENT: normocephalic, no nasal discharge, oropharyngeal mucous membranes moist, has large tonsils without exudate or erythema  MS: no gross musculoskeletal defects noted. Full active ROM of low back without pain. No spinous process/midline tenderness to palpation. Low back paraspinal muscle tenderness to palpation. No CVA tenderness bilaterally.  SKIN: no suspicious lesions or rashes. Spider vein noted on R calf near knee.  NEURO: Normal strength and tone, mentation intact and speech normal  PSYCH: mentation appears normal, affect normal      ----- Service Performed and Documented by Resident or Fellow ------  "

## 2024-12-10 ENCOUNTER — LAB REQUISITION (OUTPATIENT)
Dept: LAB | Facility: CLINIC | Age: 44
End: 2024-12-10

## 2024-12-10 DIAGNOSIS — Z13.6 ENCOUNTER FOR SCREENING FOR CARDIOVASCULAR DISORDERS: ICD-10-CM

## 2024-12-10 DIAGNOSIS — Z13.1 ENCOUNTER FOR SCREENING FOR DIABETES MELLITUS: ICD-10-CM

## 2024-12-10 PROCEDURE — 80061 LIPID PANEL: CPT | Performed by: FAMILY MEDICINE

## 2024-12-10 PROCEDURE — 82947 ASSAY GLUCOSE BLOOD QUANT: CPT | Performed by: FAMILY MEDICINE

## 2024-12-10 PROCEDURE — 82465 ASSAY BLD/SERUM CHOLESTEROL: CPT | Performed by: FAMILY MEDICINE

## 2024-12-11 LAB
CHOLEST SERPL-MCNC: 185 MG/DL
FASTING STATUS PATIENT QL REPORTED: YES
FASTING STATUS PATIENT QL REPORTED: YES
GLUCOSE SERPL-MCNC: 92 MG/DL (ref 70–99)
HDLC SERPL-MCNC: 37 MG/DL
LDLC SERPL CALC-MCNC: 124 MG/DL
NONHDLC SERPL-MCNC: 148 MG/DL
TRIGL SERPL-MCNC: 121 MG/DL